# Patient Record
Sex: FEMALE | Race: WHITE | NOT HISPANIC OR LATINO | URBAN - METROPOLITAN AREA
[De-identification: names, ages, dates, MRNs, and addresses within clinical notes are randomized per-mention and may not be internally consistent; named-entity substitution may affect disease eponyms.]

---
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
## 2023-04-07 NOTE — ED PEDIATRIC NURSE NOTE - NS ED NURSE REPORT GIVEN DT
Inspection of the colon, rectum including retroflexion view revealed no polyps, masses, diverticula or other abnormalities.       Recommendations:  follow up colonoscopy in 10 years.    Left message to call back office.  perla   11-Apr-2022 20:08

## 2023-09-13 NOTE — ED ADULT TRIAGE NOTE - NSTRIAGECARE_GEN_A_ER
Face Mask Detail Level: Zone Render In Strict Bullet Format?: No Initiate Treatment: TAC/menthol/camphor 454 gms \\nQuantity: 454.0 g\\nTAC/menthol/camphor 454 gms (0.5% camphor, 0.5% menthol in 1% triamcinolone acetonide)\\nSig: AAA to AA BID for 2 weeks

## 2024-03-06 NOTE — ED PEDIATRIC TRIAGE NOTE - NS ED TRIAGE AVPU SCALE
Strep test negative in office, most likely viral pharyngitis. Continue symptom management at home, stay well hydrated. Suggest sudafed for drainage of sinuses/ears. Call office if symptoms do not improve.  
Alert-The patient is alert, awake and responds to voice. The patient is oriented to time, place, and person. The triage nurse is able to obtain subjective information.

## 2025-07-17 NOTE — PATIENT PROFILE PEDIATRIC. - URINARY CATHETER
Patient arrived to infusion suite for Privigen 85 g, day 2/2 infusion. Patient took her own Tylenol 650 mg PO, Benadryl 25 mg PO and Pepcid 20 mg PO at home prior to arrival. Patient declined premed of Methylprednisolone today. Patient stated she don't need it today.    Privigen initiated and titrated as ordered with the following rates every 30 minutes: 25 ml/hr, 50 ml/hr, 100 ml/hr and max rate of 199 ml/hr until infusion volume completed. Patient tolerated. VS stable.    Next appointment scheduled and patient made aware.     Limited head-to-toe assessment due to privacy issues and visit reason though the opportunity was given for patient to express any concerns.           no